# Patient Record
Sex: MALE | Race: OTHER | HISPANIC OR LATINO
[De-identification: names, ages, dates, MRNs, and addresses within clinical notes are randomized per-mention and may not be internally consistent; named-entity substitution may affect disease eponyms.]

---

## 2024-08-15 ENCOUNTER — RX ONLY (RX ONLY)
Age: 39
End: 2024-08-15

## 2024-08-15 ENCOUNTER — OFFICE (OUTPATIENT)
Facility: LOCATION | Age: 39
Setting detail: OPHTHALMOLOGY
End: 2024-08-15
Payer: COMMERCIAL

## 2024-08-15 DIAGNOSIS — T15.01X: ICD-10-CM

## 2024-08-15 PROCEDURE — 99203 OFFICE O/P NEW LOW 30 MIN: CPT | Performed by: OPHTHALMOLOGY

## 2024-08-15 ASSESSMENT — CONFRONTATIONAL VISUAL FIELD TEST (CVF)
OS_FINDINGS: FULL
OD_FINDINGS: FULL

## 2024-10-01 ENCOUNTER — APPOINTMENT (OUTPATIENT)
Dept: ORTHOPEDIC SURGERY | Facility: CLINIC | Age: 39
End: 2024-10-01
Payer: COMMERCIAL

## 2024-10-01 DIAGNOSIS — M51.16 INTERVERTEBRAL DISC DISORDERS WITH RADICULOPATHY, LUMBAR REGION: ICD-10-CM

## 2024-10-01 DIAGNOSIS — Z00.00 ENCOUNTER FOR GENERAL ADULT MEDICAL EXAMINATION W/OUT ABNORMAL FINDINGS: ICD-10-CM

## 2024-10-01 DIAGNOSIS — G40.909 EPILEPSY, UNSPECIFIED, NOT INTRACTABLE, W/OUT STATUS EPILEPTICUS: ICD-10-CM

## 2024-10-01 PROCEDURE — 99204 OFFICE O/P NEW MOD 45 MIN: CPT

## 2024-10-02 PROBLEM — M51.16 LUMBAR DISC HERNIATION WITH RADICULOPATHY: Status: ACTIVE | Noted: 2024-10-02

## 2024-10-02 NOTE — HISTORY OF PRESENT ILLNESS
[de-identified] : 10/1/24: 39 year old male is here for his lower back that started over a year ago. Radiates down R leg to ankle with N/T. Went to , had MRI done 7/11/24, informed had HNP. Received multiple TPI's, tried meds and attending PT for about 4 months w/o sustained relief.   [FreeTextEntry1] : L SPine

## 2024-10-02 NOTE — DATA REVIEWED
[MRI] : MRI [Lumbar Spine] : lumbar spine [Report was reviewed and noted in the chart] : The report was reviewed and noted in the chart [I independently reviewed and interpreted images and report] : I independently reviewed and interpreted images and report [FreeTextEntry1] : L Spine MRI 7/11/24 1. 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

## 2024-10-02 NOTE — IMAGING
[de-identified] : L Spine Inspection: No defects or deformities Palpation: Spasms in b/l lumbar paraspinal musculature ROM: limited in all planes  Strength: 5/5 b/l hip flexors, knee extensors,  Right EHL 4/5 5/5 ankle plantarflexors Neuro: Sensation LT I + R SLR Toe and heal walk intact Gait antalgic

## 2024-10-02 NOTE — IMAGING
[de-identified] : L Spine Inspection: No defects or deformities Palpation: Spasms in b/l lumbar paraspinal musculature ROM: limited in all planes  Strength: 5/5 b/l hip flexors, knee extensors,  Right EHL 4/5 5/5 ankle plantarflexors Neuro: Sensation LT I + R SLR Toe and heal walk intact Gait antalgic

## 2024-10-02 NOTE — ASSESSMENT
[FreeTextEntry1] : 40 y/o M with chronic low back pain and RLE radic x 1 year. 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 [facility] has done extensive conservative care including: PT, CSI's and meds w/o sustained relief.   - Reviewed surgical options: plan for a discectomy.  - [facility] agrees and would like to move forward with sx.   The risks and benefits of surgery have been discussed. Risks include but are not limited to bleeding, infection, reaction to anesthesia, injury to blood vessels and nerves, malunion, nonunion, DVT, PE, necessity of repeat surgery, CF leak/repair, chronic pain, loss of limb and death. The patient understands the risks and agrees with the surgical plan. All questions have been answered.

## 2024-10-02 NOTE — HISTORY OF PRESENT ILLNESS
[de-identified] : 10/1/24: 39 year old male is here for his lower back that started over a year ago. Radiates down R leg to ankle with N/T. Went to , had MRI done 7/11/24, informed had HNP. Received multiple TPI's, tried meds and attending PT for about 4 months w/o sustained relief.   [FreeTextEntry1] : L SPine

## 2024-10-09 ENCOUNTER — NON-APPOINTMENT (OUTPATIENT)
Age: 39
End: 2024-10-09